# Patient Record
Sex: FEMALE | Race: WHITE | NOT HISPANIC OR LATINO | Employment: UNEMPLOYED | URBAN - METROPOLITAN AREA
[De-identification: names, ages, dates, MRNs, and addresses within clinical notes are randomized per-mention and may not be internally consistent; named-entity substitution may affect disease eponyms.]

---

## 2019-12-05 ENCOUNTER — HOSPITAL ENCOUNTER (EMERGENCY)
Facility: HOSPITAL | Age: 29
Discharge: HOME | End: 2019-12-05
Attending: EMERGENCY MEDICINE
Payer: MEDICAID

## 2019-12-05 VITALS
HEIGHT: 62 IN | OXYGEN SATURATION: 100 % | RESPIRATION RATE: 18 BRPM | SYSTOLIC BLOOD PRESSURE: 134 MMHG | TEMPERATURE: 98 F | DIASTOLIC BLOOD PRESSURE: 90 MMHG | BODY MASS INDEX: 20.24 KG/M2 | HEART RATE: 78 BPM | WEIGHT: 110 LBS

## 2019-12-05 DIAGNOSIS — S09.92XA NOSE INJURY, INITIAL ENCOUNTER: Primary | ICD-10-CM

## 2019-12-05 PROCEDURE — 99281 EMR DPT VST MAYX REQ PHY/QHP: CPT | Mod: ER

## 2019-12-05 NOTE — ED PROVIDER NOTES
Encounter Date: 2019       History     Chief Complaint   Patient presents with    Facial Pain     PT to ERwith c/o nose pain s/p walking into attic door. Pt with no other complaints      29-year-old female with nose pain x4 days.  She walked in to her attic stairs on accident, and reports mild pain, that has improved since then.  She explains her nose looks like it might be deviated and is concerned it may be broken.  She denies problems breathing through her nose.  She has minimal pain.        Review of patient's allergies indicates:   Allergen Reactions    Benadryl [diphenhydramine hcl]      Past Medical History:   Diagnosis Date    Anxiety     Opiate abuse, episodic      Past Surgical History:   Procedure Laterality Date     SECTION       Family History   Problem Relation Age of Onset    Breast cancer Paternal Grandmother     Colon cancer Father     Ovarian cancer Neg Hx      Social History     Tobacco Use    Smoking status: Current Every Day Smoker     Packs/day: 0.50   Substance Use Topics    Alcohol use: No    Drug use: Yes     Review of Systems   Constitutional: Negative for fever.   HENT: Negative for sore throat.         Nose pain   Eyes: Negative for visual disturbance.   Respiratory: Negative for shortness of breath.    Cardiovascular: Negative for chest pain.   Musculoskeletal: Negative for neck pain.   Skin: Negative for rash.   Neurological: Negative for headaches.   Hematological: Does not bruise/bleed easily.       Physical Exam     Initial Vitals [19 1241]   BP Pulse Resp Temp SpO2   (!) 134/90 78 18 98.2 °F (36.8 °C) 100 %      MAP       --         Physical Exam    Vitals reviewed.  Constitutional: She appears well-developed and well-nourished. She is not diaphoretic. No distress.   HENT:   Head: Normocephalic and atraumatic.   Right Ear: External ear normal.   Left Ear: External ear normal.   Nose: Nose normal.   Superficial laceration across bridge of nose with mild  ecchymosis. No bony deformity, septal hematoma, or epistaxis.   Eyes: Conjunctivae and EOM are normal. Pupils are equal, round, and reactive to light. No scleral icterus.   Neck: Normal range of motion. Neck supple.   Cardiovascular: Normal rate and regular rhythm.   Pulmonary/Chest: No respiratory distress.   Musculoskeletal: Normal range of motion.   Neurological: She is alert and oriented to person, place, and time.   Skin: Skin is warm and dry.         ED Course   Procedures  Labs Reviewed - No data to display       Imaging Results    None          Medical Decision Making:   ED Management:  29-year-old female with history exam consistent with nasal contusion.  Very low suspicion for a nasal bone fracture.  No evidence of septic hematoma or other serious injury. Patient encouraged to take Tylenol or ibuprofen as needed for pain. Discussed further evaluation with x-ray.  Patient declined.  Will discharged with ENT follow-up information.                                 Clinical Impression:       ICD-10-CM ICD-9-CM   1. Nose injury, initial encounter S09.92XA 959.09                             Benny Amaya PA-C  12/05/19 [id number]